# Patient Record
Sex: FEMALE | Race: WHITE
[De-identification: names, ages, dates, MRNs, and addresses within clinical notes are randomized per-mention and may not be internally consistent; named-entity substitution may affect disease eponyms.]

---

## 2018-01-08 ENCOUNTER — HOSPITAL ENCOUNTER (OUTPATIENT)
Dept: HOSPITAL 47 - RADCTMAIN | Age: 53
Discharge: HOME | End: 2018-01-08
Attending: FAMILY MEDICINE
Payer: COMMERCIAL

## 2018-01-08 DIAGNOSIS — J34.2: Primary | ICD-10-CM

## 2018-01-08 PROCEDURE — 70486 CT MAXILLOFACIAL W/O DYE: CPT

## 2018-01-09 NOTE — CT
EXAMINATION TYPE: CT sinus wo con

 

DATE OF EXAM: 1/8/2018

 

COMPARISON: NONE

 

HISTORY: Facial pain and pressure for 1-2 months

 

CT DLP: 667.3 mGycm

 

CONTRAST: None

 

The paranasal sinuses are examined in the axial plane at 2 mm thick sections.  Reconstructed images i
n the coronal plane were obtained.  

 

There is dental amalgam scatter artifact. Maxillary spine is intact.

 

The maxillary sinuses are clear.  The ethmoid air cells are clear.  The sphenoid sinuses are clear.  
The frontal sinuses are clear.  

 

The septum is evaluated.  There is septal deviation to the left.

The ostiomeatal units are patent.

 

 

 

IMPRESSIONS:

1.  No acute or chronic sinusitis.

2. Left septal deviation.

## 2018-03-23 ENCOUNTER — HOSPITAL ENCOUNTER (OUTPATIENT)
Dept: HOSPITAL 47 - ORWHC2ENDO | Age: 53
End: 2018-03-23
Attending: SURGERY
Payer: COMMERCIAL

## 2018-03-23 VITALS — DIASTOLIC BLOOD PRESSURE: 70 MMHG | HEART RATE: 65 BPM | SYSTOLIC BLOOD PRESSURE: 126 MMHG

## 2018-03-23 VITALS — BODY MASS INDEX: 32.4 KG/M2

## 2018-03-23 VITALS — RESPIRATION RATE: 18 BRPM

## 2018-03-23 VITALS — TEMPERATURE: 98 F

## 2018-03-23 DIAGNOSIS — M79.7: ICD-10-CM

## 2018-03-23 DIAGNOSIS — K25.9: ICD-10-CM

## 2018-03-23 DIAGNOSIS — E07.9: ICD-10-CM

## 2018-03-23 DIAGNOSIS — M19.90: ICD-10-CM

## 2018-03-23 DIAGNOSIS — K29.50: Primary | ICD-10-CM

## 2018-03-23 DIAGNOSIS — Z79.1: ICD-10-CM

## 2018-03-23 DIAGNOSIS — K57.30: ICD-10-CM

## 2018-03-23 DIAGNOSIS — Z88.0: ICD-10-CM

## 2018-03-23 DIAGNOSIS — Z86.73: ICD-10-CM

## 2018-03-23 DIAGNOSIS — Z79.899: ICD-10-CM

## 2018-03-23 DIAGNOSIS — Z79.890: ICD-10-CM

## 2018-03-23 DIAGNOSIS — M48.00: ICD-10-CM

## 2018-03-23 PROCEDURE — 88305 TISSUE EXAM BY PATHOLOGIST: CPT

## 2018-03-23 PROCEDURE — 78227 HEPATOBIL SYST IMAGE W/DRUG: CPT

## 2018-03-23 PROCEDURE — 45380 COLONOSCOPY AND BIOPSY: CPT

## 2018-03-23 PROCEDURE — 43239 EGD BIOPSY SINGLE/MULTIPLE: CPT

## 2018-03-23 NOTE — P.GSHP
History of Present Illness


H&P Date: 03/23/18


Chief Complaint: GERD, diverticulitis, colitis





Assistant 52-year-old female for from Dr. Sanford.  Patient presents today for 

EGD colonoscopy.  She's had issues with GERD and diverticulitis.





Past Medical History


Past Medical History: CVA/TIA, Fibromyalgia, Osteoarthritis (OA), Thyroid 

Disorder


Additional Past Medical History / Comment(s): TIA X2 (2014), STENOSIS BACK, 

BACK & NECK PAIN.


History of Any Multi-Drug Resistant Organisms: C-DIFF


Date of last positivie culture/infection: Jan 2018


MDRO Source:: stool


Past Surgical History: Orthopedic Surgery, Tonsillectomy


Additional Past Surgical History / Comment(s): RECONSTRUCTIVE SURGERY LEFT ANKLE

, LEFT ROTATOR CUFF.


Past Anesthesia/Blood Transfusion Reactions: Family History of Problems w/ 

Anesthesia, Motion Sickness, Postoperative Nausea & Vomiting (PONV)


Additional Past Anesthesia/Blood Transfusion Reaction / Comment(s): MOTHER=PONV


Smoking Status: Never smoker





- Past Family History


  ** Mother


Family Medical History: No Reported History





  ** Father


Family Medical History: Cancer


Additional Family Medical History / Comment(s): colon





Medications and Allergies


 Home Medications











 Medication  Instructions  Recorded  Confirmed  Type


 


Acetaminophen-Codeine 300-30mg 1 tab PO Q4-6H PRN 02/01/18 03/23/18 History





[Tylenol #3]    


 


Aspirin 81 mg PO DAILY 02/01/18 03/21/18 History


 


Levothyroxine Sodium [Synthroid] 75 mcg PO QAM 02/01/18 03/23/18 History


 


Melatonin 1.5 mg PO HS 02/01/18 03/23/18 History


 


Meloxicam [Mobic] 15 mg PO DAILY 02/01/18 03/21/18 History


 


PARoxetine HCL 40 mg PO QAM 02/01/18 03/23/18 History


 


Pregabalin [Lyrica] 150 mg PO HS 02/01/18 03/23/18 History











 Allergies











Allergy/AdvReac Type Severity Reaction Status Date / Time


 


Penicillins Allergy Unknown Rash/Hives Verified 03/21/18 11:37














Surgical - Exam


 Vital Signs











Temp Pulse Resp BP Pulse Ox


 


 98.0 F   81   16   116/74   97 


 


 03/23/18 11:17  03/23/18 11:17  03/23/18 11:17  03/23/18 11:17  03/23/18 11:17














- General


well developed, no distress





- Eyes


PERRL





- ENT


normal pinna





- Neck


no masses





- Respiratory


normal expansion





- Cardiovascular


Rhythm: regular





- Abdomen





Mild left-sided abdominal pain


Abdomen: soft





Assessment and Plan


Assessment: 





GERD





Diverticulitis/colitis





We'll perform EGD colonoscopy.

## 2018-03-23 NOTE — NM
Nuclear medicine hepatobiliary scan.

 

HISTORY: Pain.

 

DOSAGE: The patient received 1.85 mcg of CCK and  4.7  mCi of Technetium 99m Choletec.  

 

FINDINGS: There is normal hepatic extraction.  The gallbladder is seen by 40 minutes.  There is bilia
ry to bowel clearance by 20 minutes.  Ejection fraction is 91%.

 

IMPRESSION:

1. Ejection fraction of 91% can occasionally be seen with hyperdynamic gallbladder. Correlate clinica
lly.

## 2018-03-23 NOTE — P.OP
Date of Procedure: 03/23/18


Preoperative Diagnosis: 


GERD





Diverticulitis





Colitis


Postoperative Diagnosis: 


Antral ulceration


Procedure(s) Performed: 


EGD





Colonoscopy


Anesthesia: MAC


Surgeon: Lane Castellanos


Pathology: other (Antral ulceration)


Condition: stable


Disposition: PACU


Indications for Procedure: 


History of GERD and C. diff colitis


Description of Procedure: 


The patient's placed on the endoscopy table lateral position.  She received IV 

sedation.  The gastroscope placed oropharynx passed in the esophagus into the 

stomach.  The scope was then placed through the pylorus.  The first and second 

portion of the duodenum appeared normal.  Scope was then brought back the 

antrum and this appeared appeared inflamed.  There was evidence of small 

punctate ulcers.  His area is biopsied.  The scope was unretroflexed and 

remainder stomach appeared normal.  The GE junction was at 40 cm.  There is no 

evidence of a hiatal hernia.  The distal esophagus appeared normal.  The 

proximal esophagus.  Normal.  Scope was withdrawn for patient.





Next digital rectal exam was performed.  There is no evidence of significant 

hemorrhoids.  The flexible colonoscope was then placed patient anus passed 

throughout the entire colon.  The ileocecal valve was visualized.  Cecum, 

ascending and transverse colon appeared normal.  In the descending and sigmoid 

colon was a few scattered diverticula.  A random sigmoid colon biopsies 

performed due to the patient's previous history of colitis.  The flexible 

colonoscope was then brought back the rectum and this appeared normal.  Scope 

was withdrawn for patient.

## 2018-04-25 ENCOUNTER — HOSPITAL ENCOUNTER (OUTPATIENT)
Dept: HOSPITAL 47 - OR | Age: 53
Discharge: HOME | End: 2018-04-25
Attending: SURGERY
Payer: COMMERCIAL

## 2018-04-25 VITALS — SYSTOLIC BLOOD PRESSURE: 100 MMHG | HEART RATE: 66 BPM | DIASTOLIC BLOOD PRESSURE: 62 MMHG

## 2018-04-25 VITALS — RESPIRATION RATE: 16 BRPM | TEMPERATURE: 97.4 F

## 2018-04-25 VITALS — BODY MASS INDEX: 32.9 KG/M2

## 2018-04-25 DIAGNOSIS — Z86.73: ICD-10-CM

## 2018-04-25 DIAGNOSIS — Z88.0: ICD-10-CM

## 2018-04-25 DIAGNOSIS — K81.1: Primary | ICD-10-CM

## 2018-04-25 DIAGNOSIS — F32.9: ICD-10-CM

## 2018-04-25 DIAGNOSIS — M19.90: ICD-10-CM

## 2018-04-25 DIAGNOSIS — M79.7: ICD-10-CM

## 2018-04-25 DIAGNOSIS — G89.29: ICD-10-CM

## 2018-04-25 DIAGNOSIS — Z79.1: ICD-10-CM

## 2018-04-25 DIAGNOSIS — E07.9: ICD-10-CM

## 2018-04-25 DIAGNOSIS — Z79.899: ICD-10-CM

## 2018-04-25 PROCEDURE — 47562 LAPAROSCOPIC CHOLECYSTECTOMY: CPT

## 2018-04-25 PROCEDURE — 88304 TISSUE EXAM BY PATHOLOGIST: CPT

## 2018-04-25 NOTE — P.GSHP
History of Present Illness


H&P Date: 04/25/18


Chief Complaint: Right upper quadrant pain





This a 52-year-old female referred from Dr. Sanford.  Patient presents today for 

laparoscopic cholecystectomy.  She's had complaints of right quadrant pain.  

Patient had a recent HIDA scan with abnormal elevated ejection fraction





Past Medical History


Past Medical History: CVA/TIA, Fibromyalgia, Osteoarthritis (OA), Thyroid 

Disorder


Additional Past Medical History / Comment(s): TIA X2 (2014), STENOSIS BACK, 

BACK & NECK PAIN.


History of Any Multi-Drug Resistant Organisms: C-DIFF


Date of last positivie culture/infection: Jan 2018


MDRO Source:: stool


Past Surgical History: Orthopedic Surgery, Tonsillectomy


Additional Past Surgical History / Comment(s): RECONSTRUCTIVE SURGERY LEFT ANKLE

, LEFT ROTATOR CUFF.


Past Anesthesia/Blood Transfusion Reactions: Motion Sickness, Postoperative 

Nausea & Vomiting (PONV)


Additional Past Anesthesia/Blood Transfusion Reaction / Comment(s): MOTHER=PONV


Past Psychological History: No Psychological Hx Reported


Smoking Status: Never smoker


Past Alcohol Use History: None Reported


Past Drug Use History: None Reported





- Past Family History


  ** Mother


Family Medical History: Congestive Heart Failure (CHF), Diabetes Mellitus





  ** Father


Family Medical History: Cancer


Additional Family Medical History / Comment(s): colon





Medications and Allergies


 Home Medications











 Medication  Instructions  Recorded  Confirmed  Type


 


Levothyroxine Sodium [Synthroid] 75 mcg PO QAM 02/01/18 04/19/18 History


 


Meloxicam [Mobic] 15 mg PO DAILY 02/01/18 04/25/18 History


 


PARoxetine HCL 40 mg PO QAM 02/01/18 04/19/18 History


 


Pregabalin [Lyrica] 150 mg PO HS 02/01/18 04/25/18 History


 


Omeprazole 40 mg PO DAILY #60 capsule. 03/23/18 04/19/18 Rx











 Allergies











Allergy/AdvReac Type Severity Reaction Status Date / Time


 


Penicillins Allergy Unknown Rash/Hives Verified 04/19/18 15:45














Surgical - Exam


 Vital Signs











Temp Pulse Resp BP Pulse Ox


 


 97.6 F   74   18   96/63   97 


 


 04/25/18 11:50  04/25/18 11:50  04/25/18 11:50  04/25/18 11:50  04/25/18 11:50














- General


well developed, no distress





- Eyes


PERRL





- ENT


normal pinna





- Neck


no masses, no bruits





- Respiratory


normal expansion





- Cardiovascular


Rhythm: regular





- Abdomen


Abdomen: soft, non tender





Assessment and Plan


Assessment: 





Chronic cholecystitis





Abnormal HIDA scan





We'll perform laparoscopic cholecystectomy.

## 2018-04-25 NOTE — P.OP
Date of Procedure: 04/25/18


Preoperative Diagnosis: 


Cholecystitis


Postoperative Diagnosis: 


Cholecystitis


Procedure(s) Performed: 


Laparoscopic cholecystectomy


Anesthesia: WALI


Surgeon: Lane Castellanos


Estimated Blood Loss (ml): 5


Pathology: other (Gallbladder)


Condition: stable


Disposition: PACU


Description of Procedure: 


The patient was placed on the operating table.   The patient received a general 

endotracheal tube anesthesia.    The patients abdomen was prepped and draped 

in the usual sterile fashion.    Through an infraumbilical stab incision, the 

fascia of the anterior abdominal wall was grasped with a pair of Kochers and 

then the Veress needle was placed in the peritoneal cavity.   Position of the 

Veress needle was confirmed with positive drop test.   The abdomen was then 

insufflated.  After adequate insufflation, the 10 mm trocar was placed in the 

peritoneal cavity.    Following this the laparoscope was placed in the 

peritoneal cavity. The patient was placed in the head-up, right side up 

position and then a 5 mm trocar was placed in the right lateral and right 

subcostal position under direct visualization.    A 8 mm trocar was placed in 

the epigastric position.  The gallbladder was grasped in the fundus and 

infundibulum.  Traction  on the gallbladder was placed in the lateral and the 

cephalad positions.  The triangle of Calot  was visualized..   The cystic duct 

was bluntly dissected until the union of the cystic duct and common bile duct 

was seen.    The cystic duct was then divided and sealed with the Harmonic 

scissors.  A PDS Endoloop was then placed throughout the cystic duct stump.  

The cystic artery divided and sealed with the Harmonic scissors.   The 

gallbladder was then removed from the liver bed using Harmonic scissors.   The 

gallbladder was then extracted through the epigastric port site.  Operative 

field was checked for any bleeding spots and Harmonic scissors was used to 

coagulate the liver bed.    The abdomen was irrigated.   The trocars were 

removed.  The skin was closed using interrupted 3-0 Vicryl suture.   Dermabond 

dressing were applied.   The patient tolerated the procedure well.

## 2018-05-22 ENCOUNTER — HOSPITAL ENCOUNTER (OUTPATIENT)
Dept: HOSPITAL 47 - RADUSWWP | Age: 53
Discharge: HOME | End: 2018-05-22
Payer: COMMERCIAL

## 2018-05-22 DIAGNOSIS — G43.009: Primary | ICD-10-CM

## 2018-05-22 DIAGNOSIS — H81.20: ICD-10-CM

## 2018-05-22 PROCEDURE — 93880 EXTRACRANIAL BILAT STUDY: CPT

## 2018-05-23 NOTE — US
EXAMINATION TYPE: US carotid duplex BILAT

 

DATE OF EXAM: 5/22/2018

 

COMPARISON: NONE

 

CLINICAL HISTORY: G43.009 Atypical Migraine.

 

EXAM MEASUREMENTS: 

 

RIGHT:  Peak Systolic Velocity (PSV) cm/sec

----- Right CCA:  79.3  

----- Right ICA:  79.7     

----- Right ECA:  73.2   

ICA/CCA ratio:  1.0    

 

RIGHT:  End Diastole cm/sec

----- Right CCA:  27.0   

----- Right ICA:  43.5      

----- Right ECA:  18.2     

 

LEFT:  Peak Systolic Velocity (PSV) cm/sec

----- Left CCA:  79.0  

----- Left ICA:  87.5   

----- Left ECA:  55.2  

ICA/CCA ratio:  1.1  

 

LEFT:  End Diastole cm/sec

----- Left CCA:  25.0  

----- Left ICA:  41.3   

----- Left ECA:  14.0 

 

VERTEBRALS (direction of flow):

Right Vertebral: Antegrade

Left Vertebral: Antegrade

 

Rhythm:  Normal

 

No significant stenosis seen, ICA's are tortuous bilaterally.

 

 

 

IMPRESSION:  No hemodynamically significant stenosis appreciated.   

 

 

Criteria for Assigning % of Stenosis / Diameter reduction

(Estimation based on the indirect measurements of the internal carotid artery velocities (ICA PSV).

1.  Normal (no stenosis)=ICA PSV < 125 cm/s: ratio < 2.0: ICA EDV<40 cm/s.

2. Less than 50% stenosis=ICA PSV < 125 cm/s: ratio < 2.0: ICA EDV<40 cm/s.

3.  50 to 69% stenosis=ICA PSV of 125 to 230 cm/s: ration 2.0 ? 4.0: ICA EDV  cm/s.

4.  Greater than 70% stenosis to near occlusion= ICA PSV > 230 cm/s: ratio > 4.0: ICA EDV > 100 cm/s.
 

5.  Near occlusion= ICA PSV velocities may be low or undetectable: variable ratio and ICA EDV.

6.  Total occlusion=unable to detect flow.

## 2019-01-10 ENCOUNTER — HOSPITAL ENCOUNTER (OUTPATIENT)
Dept: HOSPITAL 47 - RADMAMWWP | Age: 54
Discharge: HOME | End: 2019-01-10
Payer: COMMERCIAL

## 2019-01-10 DIAGNOSIS — Z12.31: Primary | ICD-10-CM

## 2019-01-10 PROCEDURE — 77067 SCR MAMMO BI INCL CAD: CPT

## 2019-01-19 NOTE — MM
Reason for exam: screening  (asymptomatic).

Baseline mammogram.



History:

Patient is postmenopausal.



MG Screening Mammo w CAD

Bilateral CC and MLO view(s) were taken.

Prior study comparison: August 1, 2000, bilateral special view mammogram.

There are scattered fibroglandular densities.

No discrete abnormality.





ASSESSMENT: Negative, BI-RAD 1



RECOMMENDATION:

Routine screening mammogram of both breasts in 1 year.

## 2023-01-31 ENCOUNTER — HOSPITAL ENCOUNTER (OUTPATIENT)
Dept: HOSPITAL 47 - ORWHC2ENDO | Age: 58
Discharge: HOME | End: 2023-01-31
Attending: INTERNAL MEDICINE
Payer: COMMERCIAL

## 2023-01-31 VITALS — RESPIRATION RATE: 16 BRPM | TEMPERATURE: 97.3 F

## 2023-01-31 VITALS — BODY MASS INDEX: 36.3 KG/M2

## 2023-01-31 VITALS — SYSTOLIC BLOOD PRESSURE: 121 MMHG | DIASTOLIC BLOOD PRESSURE: 75 MMHG | HEART RATE: 84 BPM

## 2023-01-31 DIAGNOSIS — Z79.890: ICD-10-CM

## 2023-01-31 DIAGNOSIS — E03.9: ICD-10-CM

## 2023-01-31 DIAGNOSIS — M19.90: ICD-10-CM

## 2023-01-31 DIAGNOSIS — Z86.73: ICD-10-CM

## 2023-01-31 DIAGNOSIS — Z99.89: ICD-10-CM

## 2023-01-31 DIAGNOSIS — G47.33: ICD-10-CM

## 2023-01-31 DIAGNOSIS — Z98.890: ICD-10-CM

## 2023-01-31 DIAGNOSIS — M79.7: ICD-10-CM

## 2023-01-31 DIAGNOSIS — Z80.0: ICD-10-CM

## 2023-01-31 DIAGNOSIS — Z90.49: ICD-10-CM

## 2023-01-31 DIAGNOSIS — Z79.899: ICD-10-CM

## 2023-01-31 DIAGNOSIS — Z88.1: ICD-10-CM

## 2023-01-31 DIAGNOSIS — K57.30: ICD-10-CM

## 2023-01-31 DIAGNOSIS — Z88.0: ICD-10-CM

## 2023-01-31 DIAGNOSIS — Z12.11: Primary | ICD-10-CM

## 2023-01-31 PROCEDURE — 45378 DIAGNOSTIC COLONOSCOPY: CPT

## 2023-01-31 NOTE — P.PCN
Date of Procedure: 01/31/23


Procedure(s) Performed: 


BRIEF HISTORY: Patient is a 57-year-old pleasant white female scheduled for an 

elective colonoscopy as a part of screening for colon cancer and family history 

of colon cancer.  Her father was diagnosed with colon cancer at age 55.





PROCEDURE PERFORMED: Colonoscopy. 





PREOPERATIVE DIAGNOSIS: Screening for colon cancer and family history of colon 

cancer. 





IV sedation per Anesthesia. 





PROCEDURE: After informed consent was obtained, the patient, was brought into 

the endoscopy unit. IV sedation was administered by Anesthesia under continuous 

monitoring.  Digital rectal examination was normal. Initially the Olympus CF-160

flexible video colonoscope was then inserted in the rectum, gradually advanced 

into the cecum without any difficulty. Careful examination was performed as the 

scope was gradually being withdrawn. Ileocecal valve and the appendiceal orifice

were visualized and appeared normal.  Prep was excellent. Mucosa of the cecum, 

ascending colon, transverse colon, descending colon, sigmoid colon, and rectum 

appeared normal.  Scattered sigmoid diverticulosis.  Retroflexion was performed 

in the rectum and no lesions were seen. The patient tolerated the procedure 

well. 





IMPRESSION: Normal-appearing colon from rectum to cecum with no evidence of 

colorectal neoplasia.





RECOMMENDATIONS:  Findings of this examination were discussed with the patient 

as well as a family..  He was advised to have a repeat screening colonoscopy 

every 5 years because of the family history of colon cancer.

## 2023-08-11 ENCOUNTER — HOSPITAL ENCOUNTER (OUTPATIENT)
Dept: HOSPITAL 47 - RADMAMWWP | Age: 58
Discharge: HOME | End: 2023-08-11
Attending: FAMILY MEDICINE
Payer: COMMERCIAL

## 2023-08-11 DIAGNOSIS — Z12.31: Primary | ICD-10-CM

## 2023-08-11 DIAGNOSIS — Z78.0: ICD-10-CM

## 2023-08-11 PROCEDURE — 77067 SCR MAMMO BI INCL CAD: CPT

## 2023-08-14 NOTE — MM
Reason for Exam: Screening  (asymptomatic). 

Last mammogram was performed 4 year(s) and 7 month(s) ago. 





Patient History: 

Menarche at age 14. First Full-Term Pregnancy at age 25. Postmenopausal. Patient has history of

breast feeding. 





Risk Values: 

Yu 5 year model risk: 1.1%.

NCI Lifetime model risk: 7.2%.





Prior Study Comparison: 

8/1/2000 Bilateral Special View Mammogram, [facility]. 1/10/2019 Bilateral Screening Mammogram, [facility]. 





Tissue Density: 

The breast tissue is almost entirely fat.





Findings: 

Analyzed By CAD. 

There is no suspicious group of microcalcifications or new suspicious mass in either breast. 





Overall Assessment: Negative, BI-RAD 1





Management: 

Screening Mammogram of both breasts in 1 year.

[facility] will attempt to contact patient to return for supplemental views and

ultrasound if indicated.



Patient should continue monthly self-breast exams.  A clinical breast exam by your physician is

recommended on an annual basis.

This exam should not preclude additional follow-up of suspicious palpable abnormalities.



Note on Yu scores and lifetime risk:

1. A Yu score greater than 3% is considered moderate risk. If this is the case, consider

specialist referral to assess eligibility for a risk reducing agent.

2. If overall lifetime risk for the development of breast cancer is 20% or higher, the patient may

qualify for future screening with alternating mammogram and breast MRI.



Electronically signed and approved by: Isaias Almeida DO